# Patient Record
Sex: FEMALE | Race: WHITE
[De-identification: names, ages, dates, MRNs, and addresses within clinical notes are randomized per-mention and may not be internally consistent; named-entity substitution may affect disease eponyms.]

---

## 2018-02-20 ENCOUNTER — HOSPITAL ENCOUNTER (OUTPATIENT)
Dept: HOSPITAL 76 - DI | Age: 73
Discharge: HOME | End: 2018-02-20
Attending: PHYSICIAN ASSISTANT
Payer: MEDICARE

## 2018-02-20 DIAGNOSIS — Z12.31: Primary | ICD-10-CM

## 2018-02-20 DIAGNOSIS — Z80.3: ICD-10-CM

## 2018-02-20 PROCEDURE — 77067 SCR MAMMO BI INCL CAD: CPT

## 2018-02-28 ENCOUNTER — HOSPITAL ENCOUNTER (OUTPATIENT)
Dept: HOSPITAL 76 - LAB.WCP | Age: 73
Discharge: HOME | End: 2018-02-28
Attending: PHYSICIAN ASSISTANT
Payer: MEDICARE

## 2018-02-28 DIAGNOSIS — J45.20: ICD-10-CM

## 2018-02-28 DIAGNOSIS — E78.5: Primary | ICD-10-CM

## 2018-02-28 LAB
ALBUMIN DIAFP-MCNC: 4.3 G/DL (ref 3.2–5.5)
ALBUMIN/GLOB SERPL: 1.8 {RATIO} (ref 1–2.2)
ALP SERPL-CCNC: 60 IU/L (ref 42–121)
ALT SERPL W P-5'-P-CCNC: 22 IU/L (ref 10–60)
ANION GAP SERPL CALCULATED.4IONS-SCNC: 6 MMOL/L (ref 6–13)
AST SERPL W P-5'-P-CCNC: 20 IU/L (ref 10–42)
BASOPHILS NFR BLD AUTO: 0.1 10^3/UL (ref 0–0.1)
BASOPHILS NFR BLD AUTO: 0.9 %
BILIRUB BLD-MCNC: 0.5 MG/DL (ref 0.2–1)
BUN SERPL-MCNC: 16 MG/DL (ref 6–20)
CALCIUM UR-MCNC: 9 MG/DL (ref 8.5–10.3)
CHLORIDE SERPL-SCNC: 103 MMOL/L (ref 101–111)
CHOLEST SERPL-MCNC: 259 MG/DL
CO2 SERPL-SCNC: 27 MMOL/L (ref 21–32)
CREAT SERPLBLD-SCNC: 1 MG/DL (ref 0.4–1)
EOSINOPHIL # BLD AUTO: 0.3 10^3/UL (ref 0–0.7)
EOSINOPHIL NFR BLD AUTO: 3.8 %
ERYTHROCYTE [DISTWIDTH] IN BLOOD BY AUTOMATED COUNT: 14 % (ref 12–15)
GFRSERPLBLD MDRD-ARVRAT: 55 ML/MIN/{1.73_M2} (ref 89–?)
GLOBULIN SER-MCNC: 2.4 G/DL (ref 2.1–4.2)
GLUCOSE SERPL-MCNC: 96 MG/DL (ref 70–100)
HDLC SERPL-MCNC: 87 MG/DL
HDLC SERPL: 3 {RATIO} (ref ?–4.4)
HGB UR QL STRIP: 14.3 G/DL (ref 12–16)
LDLC SERPL CALC-MCNC: 143 MG/DL
LDLC/HDLC SERPL: 1.6 {RATIO} (ref ?–4.4)
LYMPHOCYTES # SPEC AUTO: 1.9 10^3/UL (ref 1.5–3.5)
LYMPHOCYTES NFR BLD AUTO: 28.1 %
MCH RBC QN AUTO: 28.4 PG (ref 27–31)
MCHC RBC AUTO-ENTMCNC: 32.9 G/DL (ref 32–36)
MCV RBC AUTO: 86.4 FL (ref 81–99)
MONOCYTES # BLD AUTO: 0.6 10^3/UL (ref 0–1)
MONOCYTES NFR BLD AUTO: 9 %
NEUTROPHILS # BLD AUTO: 3.9 10^3/UL (ref 1.5–6.6)
NEUTROPHILS # SNV AUTO: 6.7 X10^3/UL (ref 4.8–10.8)
NEUTROPHILS NFR BLD AUTO: 58.2 %
PDW BLD AUTO: 7.9 FL (ref 7.9–10.8)
PLATELET # BLD: 297 10^3/UL (ref 130–450)
PROT SPEC-MCNC: 6.7 G/DL (ref 6.7–8.2)
RBC MAR: 5.05 10^6/UL (ref 4.2–5.4)
SODIUM SERPLBLD-SCNC: 136 MMOL/L (ref 135–145)
VLDLC SERPL-SCNC: 29 MG/DL

## 2018-02-28 PROCEDURE — 80053 COMPREHEN METABOLIC PANEL: CPT

## 2018-02-28 PROCEDURE — 36415 COLL VENOUS BLD VENIPUNCTURE: CPT

## 2018-02-28 PROCEDURE — 85025 COMPLETE CBC W/AUTO DIFF WBC: CPT

## 2018-02-28 PROCEDURE — 83721 ASSAY OF BLOOD LIPOPROTEIN: CPT

## 2018-02-28 PROCEDURE — 80061 LIPID PANEL: CPT

## 2018-05-21 ENCOUNTER — HOSPITAL ENCOUNTER (OUTPATIENT)
Dept: HOSPITAL 76 - SDS | Age: 73
Discharge: HOME | End: 2018-05-21
Attending: SURGERY
Payer: MEDICARE

## 2018-05-21 VITALS — SYSTOLIC BLOOD PRESSURE: 131 MMHG | DIASTOLIC BLOOD PRESSURE: 64 MMHG

## 2018-05-21 DIAGNOSIS — D12.0: ICD-10-CM

## 2018-05-21 DIAGNOSIS — K64.8: ICD-10-CM

## 2018-05-21 DIAGNOSIS — Z79.82: ICD-10-CM

## 2018-05-21 DIAGNOSIS — J45.909: ICD-10-CM

## 2018-05-21 DIAGNOSIS — Z12.11: Primary | ICD-10-CM

## 2018-05-21 DIAGNOSIS — K57.30: ICD-10-CM

## 2018-05-21 DIAGNOSIS — K62.1: ICD-10-CM

## 2018-05-21 PROCEDURE — 45380 COLONOSCOPY AND BIOPSY: CPT

## 2018-05-21 PROCEDURE — 0DBH8ZX EXCISION OF CECUM, VIA NATURAL OR ARTIFICIAL OPENING ENDOSCOPIC, DIAGNOSTIC: ICD-10-PCS | Performed by: SURGERY

## 2018-05-21 PROCEDURE — 88305 TISSUE EXAM BY PATHOLOGIST: CPT

## 2018-05-21 PROCEDURE — 0DBP8ZX EXCISION OF RECTUM, VIA NATURAL OR ARTIFICIAL OPENING ENDOSCOPIC, DIAGNOSTIC: ICD-10-PCS | Performed by: SURGERY

## 2019-06-10 ENCOUNTER — HOSPITAL ENCOUNTER (OUTPATIENT)
Dept: HOSPITAL 76 - LAB.WCP | Age: 74
Discharge: HOME | End: 2019-06-10
Attending: PHYSICIAN ASSISTANT
Payer: MEDICARE

## 2019-06-10 DIAGNOSIS — E78.5: Primary | ICD-10-CM

## 2019-06-10 DIAGNOSIS — J30.9: ICD-10-CM

## 2019-06-10 LAB
ALBUMIN DIAFP-MCNC: 4.5 G/DL (ref 3.2–5.5)
ALBUMIN/GLOB SERPL: 1.7 {RATIO} (ref 1–2.2)
ALP SERPL-CCNC: 66 IU/L (ref 42–121)
ALT SERPL W P-5'-P-CCNC: 23 IU/L (ref 10–60)
ANION GAP SERPL CALCULATED.4IONS-SCNC: 9 MMOL/L (ref 6–13)
AST SERPL W P-5'-P-CCNC: 19 IU/L (ref 10–42)
BASOPHILS NFR BLD AUTO: 0.1 10^3/UL (ref 0–0.1)
BASOPHILS NFR BLD AUTO: 1.5 %
BILIRUB BLD-MCNC: 0.7 MG/DL (ref 0.2–1)
BUN SERPL-MCNC: 22 MG/DL (ref 6–20)
CALCIUM UR-MCNC: 9.1 MG/DL (ref 8.5–10.3)
CHLORIDE SERPL-SCNC: 103 MMOL/L (ref 101–111)
CHOLEST SERPL-MCNC: 239 MG/DL
CO2 SERPL-SCNC: 25 MMOL/L (ref 21–32)
CREAT SERPLBLD-SCNC: 1 MG/DL (ref 0.4–1)
EOSINOPHIL # BLD AUTO: 0.3 10^3/UL (ref 0–0.7)
EOSINOPHIL NFR BLD AUTO: 4.5 %
ERYTHROCYTE [DISTWIDTH] IN BLOOD BY AUTOMATED COUNT: 13.7 % (ref 12–15)
GFRSERPLBLD MDRD-ARVRAT: 54 ML/MIN/{1.73_M2} (ref 89–?)
GLOBULIN SER-MCNC: 2.7 G/DL (ref 2.1–4.2)
GLUCOSE SERPL-MCNC: 107 MG/DL (ref 70–100)
HDLC SERPL-MCNC: 93 MG/DL
HDLC SERPL: 2.6 {RATIO} (ref ?–4.4)
HGB UR QL STRIP: 13.8 G/DL (ref 12–16)
LDLC SERPL CALC-MCNC: 121 MG/DL
LDLC/HDLC SERPL: 1.3 {RATIO} (ref ?–4.4)
LYMPHOCYTES # SPEC AUTO: 1.9 10^3/UL (ref 1.5–3.5)
LYMPHOCYTES NFR BLD AUTO: 30.6 %
MCH RBC QN AUTO: 28.3 PG (ref 27–31)
MCHC RBC AUTO-ENTMCNC: 32.3 G/DL (ref 32–36)
MCV RBC AUTO: 87.6 FL (ref 81–99)
MONOCYTES # BLD AUTO: 0.6 10^3/UL (ref 0–1)
MONOCYTES NFR BLD AUTO: 9.1 %
NEUTROPHILS # BLD AUTO: 3.5 10^3/UL (ref 1.5–6.6)
NEUTROPHILS # SNV AUTO: 6.4 X10^3/UL (ref 4.8–10.8)
NEUTROPHILS NFR BLD AUTO: 54.3 %
PDW BLD AUTO: 7.9 FL (ref 7.9–10.8)
PLATELET # BLD: 310 10^3/UL (ref 130–450)
PROT SPEC-MCNC: 7.2 G/DL (ref 6.7–8.2)
RBC MAR: 4.87 10^6/UL (ref 4.2–5.4)
SODIUM SERPLBLD-SCNC: 137 MMOL/L (ref 135–145)
VLDLC SERPL-SCNC: 25 MG/DL

## 2019-06-10 PROCEDURE — 80061 LIPID PANEL: CPT

## 2019-06-10 PROCEDURE — 85025 COMPLETE CBC W/AUTO DIFF WBC: CPT

## 2019-06-10 PROCEDURE — 83721 ASSAY OF BLOOD LIPOPROTEIN: CPT

## 2019-06-10 PROCEDURE — 36415 COLL VENOUS BLD VENIPUNCTURE: CPT

## 2019-06-10 PROCEDURE — 80053 COMPREHEN METABOLIC PANEL: CPT

## 2019-06-26 ENCOUNTER — HOSPITAL ENCOUNTER (OUTPATIENT)
Dept: HOSPITAL 76 - DI | Age: 74
Discharge: HOME | End: 2019-06-26
Attending: GENERAL ACUTE CARE HOSPITAL
Payer: MEDICARE

## 2019-06-26 DIAGNOSIS — Z80.3: ICD-10-CM

## 2019-06-26 DIAGNOSIS — Z12.31: Primary | ICD-10-CM

## 2019-06-26 PROCEDURE — 77067 SCR MAMMO BI INCL CAD: CPT

## 2019-08-15 ENCOUNTER — HOSPITAL ENCOUNTER (OUTPATIENT)
Dept: HOSPITAL 76 - DI | Age: 74
Discharge: HOME | End: 2019-08-15
Attending: PHYSICIAN ASSISTANT
Payer: MEDICARE

## 2019-08-15 DIAGNOSIS — M85.89: Primary | ICD-10-CM

## 2019-08-15 PROCEDURE — 77080 DXA BONE DENSITY AXIAL: CPT

## 2019-10-28 ENCOUNTER — HOSPITAL ENCOUNTER (OUTPATIENT)
Dept: HOSPITAL 76 - LAB.WCP | Age: 74
Discharge: HOME | End: 2019-10-28
Attending: PHYSICIAN ASSISTANT
Payer: MEDICARE

## 2019-10-28 DIAGNOSIS — I10: Primary | ICD-10-CM

## 2019-10-28 LAB
ANION GAP SERPL CALCULATED.4IONS-SCNC: 9 MMOL/L (ref 6–13)
BUN SERPL-MCNC: 25 MG/DL (ref 6–20)
CALCIUM UR-MCNC: 9.2 MG/DL (ref 8.5–10.3)
CHLORIDE SERPL-SCNC: 102 MMOL/L (ref 101–111)
CO2 SERPL-SCNC: 29 MMOL/L (ref 21–32)
CREAT SERPLBLD-SCNC: 1.3 MG/DL (ref 0.4–1)
GFRSERPLBLD MDRD-ARVRAT: 40 ML/MIN/{1.73_M2} (ref 89–?)
GLUCOSE SERPL-MCNC: 113 MG/DL (ref 70–100)
SODIUM SERPLBLD-SCNC: 140 MMOL/L (ref 135–145)

## 2019-10-28 PROCEDURE — 80048 BASIC METABOLIC PNL TOTAL CA: CPT

## 2019-10-28 PROCEDURE — 36415 COLL VENOUS BLD VENIPUNCTURE: CPT

## 2019-11-12 ENCOUNTER — HOSPITAL ENCOUNTER (OUTPATIENT)
Age: 74
End: 2019-11-12
Payer: COMMERCIAL

## 2019-11-12 DIAGNOSIS — R07.89: Primary | ICD-10-CM

## 2019-11-12 DIAGNOSIS — I49.3: ICD-10-CM

## 2019-11-12 PROCEDURE — 93018 CV STRESS TEST I&R ONLY: CPT

## 2019-11-12 PROCEDURE — A9502 TC99M TETROFOSMIN: HCPCS

## 2019-11-12 PROCEDURE — 93306 TTE W/DOPPLER COMPLETE: CPT

## 2019-11-12 PROCEDURE — 93017 CV STRESS TEST TRACING ONLY: CPT

## 2019-11-12 PROCEDURE — 78452 HT MUSCLE IMAGE SPECT MULT: CPT

## 2019-11-12 PROCEDURE — 93016 CV STRESS TEST SUPVJ ONLY: CPT

## 2019-11-12 NOTE — P.PCN_ITS
Cardiac Stress Test Report    
Referral & Results    
Date Patient Seen: 11/12/19    
Time Patient Seen: 08:30    
Requesting provider: Lexy Solomon    
Indication: Chest pain    
Rest ECG: NSR with frequent PVCs    
Procedure Note: Today following both written and verbal informed consent the   
patient was exercised according to a standard Jp protocol patient went for a   
total of 6 minutes achieving a maximum heart rate of 127 maximum systolic blood   
pressure of 144.  This is approximately 7 METs.  Exercise was terminated at this  
point because of fatigue.    
    
Patient was also given Cardiolite through a previously started Hep-Lock IV by   
the nuclear medicine technician approximately 1 minute prior to the cessation of  
exercise.    
    
No signs or symptoms of angina.  NASEEM 0% on active scale.  Frequent PVCs with   
similar frequency between exercise and rest.  No change in rhythm during the   
exercise.    
Impression: Low probability for ischemia.  Will wait perfusion imaging.    
Please note: Actual ECG tracings can be found in the PACS system.

## 2019-11-12 NOTE — DI.ECHO.S_ITS
"                                    [facility]  
+---------+                        [facility]                        +---------+  
:         :                      [address]                     :         :  
:         :                      [address]                     :         :  
:         :                          [id number]                         :         :  
:         :                       Phone: [phone]                      :         :  
+---------+                        [phone]                        +---------+  
                             Echocardiogram Report  
+-----------------------------------------------------------------------------+  
:Name: GIOVANY DIAS             Study Date: 2019       Height: 59 in  :  
:[facility] MRN #: [id number]                                    Weight: 128 lb :  
:Account #: [id number]            Gender: Female               BSA: 1.5 m2    :  
:: 1945                  Age: 73 yrs                  BP: 132/82 mmHg:  
:Reason For Study: CHEST PAIN                                                 :  
:                                 Performed By: Temp Staff                    :  
:Referring: JAELYN BARROW                                                 :  
+-----------------------------------------------------------------------------+  
Interpretation Summary  
Left ventricular systolic function is normal without focal wall motion  
abnormalities with the ejection fraction visually estimated to be 60-65%.  
There is borderline concentric left ventricular hypertrophy with mild proximal  
septal thickening noted. Diastolic parameters suggest a relaxation abnormality  
of the left ventricle, consistent with probable normal filling pressures.  
   
The right ventricle is normal in size and function. The right ventricular  
systolic pressure is estimated to be at least 24 mmHg based on an estimated  
right atrial pressure of 3 mm Hg.  
   
Both atria are normal in size.  
   
There is no significant valvular heart disease.  
   
   
Procedure:   A two-dimensional transthoracic echocardiogram with color flow  
and Doppler was performed. The study quality was technically adequate. There  
is no prior echocardiogram noted for this patient. The patient was in normal  
sinus rhythm during the exam.  
Left Ventricle:   The left ventricle is normal in size. There is borderline  
concentric left ventricular hypertrophy. There is mild proximal septal  
thickening noted. Left ventricular systolic function is normal without focal  
wall motion abnormalities. The ejection fraction is estimated to be 60-65%.  
Diastolic parameters suggest a relaxation abnormality of the left ventricle,  
consistent with probable normal filling pressures.  
Right Ventricle:   The right ventricle is normal in size and function.  
Atria:   Both atria are normal in size. The interatrial septum is intact with  
no evidence for an atrial septal defect.  
Mitral Valve:   The mitral valve leaflets appear borderline thickened, but  
open well. There is trace mitral regurgitation.  
Aortic Valve:   The aortic valve is trileaflet. There is mild aortic valve  
sclerosis. The aortic valve opens well. No aortic regurgitation is present.  
Tricuspid Valve:   The tricuspid valve is normal in structure and function.  
There is trace tricuspid regurgitation. The right ventricular systolic  
pressure is estimated to be at least 24 mmHg based on an estimated right  
atrial pressure of 3 mm Hg.  
Pulmonic Valve:   The pulmonic valve is not well visualized. There is trace  
pulmonic regurgitation. There is no significant valvular heart disease.  
Great Vessels:   The aortic root is normal size. The dimensions of the  
ascending aorta are normal. The pulmonary artery is normal size.  
Pericardium/ Pleura   There is no pericardial effusion. There is no pleural  
effusion.  
   
   
MMode/2D Measurements & Calculations  
LVIDd: 2.9 cm                         
[id number]|2019 08:53:17|2019 08:53:17|PM.TREADMILL||||"Cardiac Stress Test Report

## 2019-11-12 NOTE — DI.NM.S_ITS
PROCEDURE:  NM WARREN PERF SPECT REST & STR  
Rest and exercise myocardial perfusion SPECT with gated imaging and ejection fraction  
   
RADIOPHARMACEUTICAL:  24.3 mCi Tc-99m sestamibi IV at rest and 26.7 mCi Tc-99m sestamibi   
IV at peak exercise.  A 2-day-protocol was performed.    
   
INDICATIONS:  ATYPICAL CHEST PAIN  
   
   
TECHNIQUE:  Radiopharmaceutical was injected at peak stress test, and also at rest.    
SPECT images were obtained.  SPECT myocardial perfusion images were displayed in short   
axis, horizontal long axis, and vertical long axis views.  Gated images were reviewed   
using [employer] software.    
   
COMPARISON:  None.  
   
CARDIAC STRESS:    
A standard Jp treadmill exercise tolerance test was performed by the patient under the   
supervision of an attending staff.  The patient exercised for 6 minutes and 2seconds;   
functional aerobic impairment (NASEEM) is 0%.    
Hemodynamic data:  There is normal blood pressure and heart rate response to exercise   
stress.  Patient achieved 86% of maximum predicted heart rate at peak exercise.    
Symptoms:  Patient denied chest pain during exercise.    
EKG:  Less than 1 mm upsloping ST depression with exercise. No diagnostic EKG changes of   
ischemia; no ectopy.    
   
FINDINGS:    
   
Raw data:  There is good myocardial labeling by radiotracer.  No significant motion   
artifacts.  Lung-to-heart ratio is 0.17 (normal is less than 0.38 for sestamibi tracer,   
and less than 0.50 for thallium tracer).    
   
Left ventricle function:  Gated images demonstrate normal left ventricle wall thickening.   
 No segmental wall motion abnormality.  No transient ischemic dilation; TID is 0.79   
(normal less than 1.3).  The left ventricle resting end-diastolic volume is 43 mL.  Left   
ventricle stress ejection fraction is >75% ; normal values are above 45%.    
   
Myocardial perfusion:  There is a mild, small apical defect on rest and supine stress   
images that resolves on prone imaging, consistent with artifact. Otherwise normal   
distribution of activity in the left ventricular myocardium.  No fixed or reversible   
perfusion defects.    
   
IMPRESSION:    
-Normal myocardial perfusion study with no evidence of ischemia or scar.   
-Fair exercise capacity.    
-Vigorous LVEF, overestimated due to small LV cavity.   
-Low-risk study.  
   
   
Dictated by: Jose Ross M.D. on 11/13/2019 at 17:57       
Approved by: Jose Ross M.D. on 11/13/2019 at 18:02

## 2019-11-13 ENCOUNTER — HOSPITAL ENCOUNTER (OUTPATIENT)
Age: 74
End: 2019-11-13
Payer: COMMERCIAL

## 2019-11-13 DIAGNOSIS — R07.89: Primary | ICD-10-CM

## 2019-11-13 DIAGNOSIS — Z53.9: ICD-10-CM

## 2019-11-13 PROCEDURE — 93018 CV STRESS TEST I&R ONLY: CPT

## 2019-11-13 PROCEDURE — 78452 HT MUSCLE IMAGE SPECT MULT: CPT

## 2019-11-13 PROCEDURE — 93017 CV STRESS TEST TRACING ONLY: CPT

## 2019-11-13 PROCEDURE — A9502 TC99M TETROFOSMIN: HCPCS

## 2019-11-13 PROCEDURE — 93016 CV STRESS TEST SUPVJ ONLY: CPT

## 2020-04-27 ENCOUNTER — HOSPITAL ENCOUNTER (OUTPATIENT)
Dept: HOSPITAL 76 - LAB.WCP | Age: 75
Discharge: HOME | End: 2020-04-27
Attending: PHYSICIAN ASSISTANT
Payer: MEDICARE

## 2020-04-27 DIAGNOSIS — J30.9: ICD-10-CM

## 2020-04-27 DIAGNOSIS — E78.5: Primary | ICD-10-CM

## 2020-04-27 LAB
ALBUMIN DIAFP-MCNC: 4.3 G/DL (ref 3.2–5.5)
ALBUMIN/GLOB SERPL: 1.4 {RATIO} (ref 1–2.2)
ALP SERPL-CCNC: 70 IU/L (ref 42–121)
ALT SERPL W P-5'-P-CCNC: 37 IU/L (ref 10–60)
ANION GAP SERPL CALCULATED.4IONS-SCNC: 10 MMOL/L (ref 6–13)
AST SERPL W P-5'-P-CCNC: 20 IU/L (ref 10–42)
BASOPHILS NFR BLD AUTO: 0.1 10^3/UL (ref 0–0.1)
BASOPHILS NFR BLD AUTO: 0.7 %
BILIRUB BLD-MCNC: 0.8 MG/DL (ref 0.2–1)
BUN SERPL-MCNC: 30 MG/DL (ref 6–20)
CALCIUM UR-MCNC: 9.3 MG/DL (ref 8.5–10.3)
CHLORIDE SERPL-SCNC: 101 MMOL/L (ref 101–111)
CHOLEST SERPL-MCNC: 290 MG/DL
CO2 SERPL-SCNC: 28 MMOL/L (ref 21–32)
CREAT SERPLBLD-SCNC: 1.2 MG/DL (ref 0.4–1)
EOSINOPHIL # BLD AUTO: 0.2 10^3/UL (ref 0–0.7)
EOSINOPHIL NFR BLD AUTO: 2.3 %
ERYTHROCYTE [DISTWIDTH] IN BLOOD BY AUTOMATED COUNT: 13.7 % (ref 12–15)
GLOBULIN SER-MCNC: 3.1 G/DL (ref 2.1–4.2)
GLUCOSE SERPL-MCNC: 101 MG/DL (ref 70–100)
HDLC SERPL-MCNC: 82 MG/DL
HDLC SERPL: 3.5 {RATIO} (ref ?–4.4)
HGB UR QL STRIP: 14.3 G/DL (ref 12–16)
LDLC SERPL CALC-MCNC: 182 MG/DL
LDLC/HDLC SERPL: 2.2 {RATIO} (ref ?–4.4)
LYMPHOCYTES # SPEC AUTO: 2 10^3/UL (ref 1.5–3.5)
LYMPHOCYTES NFR BLD AUTO: 27 %
MCH RBC QN AUTO: 28.1 PG (ref 27–31)
MCHC RBC AUTO-ENTMCNC: 31.2 G/DL (ref 32–36)
MCV RBC AUTO: 90.2 FL (ref 81–99)
MONOCYTES # BLD AUTO: 0.7 10^3/UL (ref 0–1)
MONOCYTES NFR BLD AUTO: 9 %
NEUTROPHILS # BLD AUTO: 4.6 10^3/UL (ref 1.5–6.6)
NEUTROPHILS # SNV AUTO: 7.5 X10^3/UL (ref 4.8–10.8)
NEUTROPHILS NFR BLD AUTO: 60.7 %
PDW BLD AUTO: 10 FL (ref 7.9–10.8)
PLATELET # BLD: 327 10^3/UL (ref 130–450)
PROT SPEC-MCNC: 7.4 G/DL (ref 6.7–8.2)
RBC MAR: 5.08 10^6/UL (ref 4.2–5.4)
SODIUM SERPLBLD-SCNC: 139 MMOL/L (ref 135–145)
VLDLC SERPL-SCNC: 26 MG/DL

## 2020-04-27 PROCEDURE — 36415 COLL VENOUS BLD VENIPUNCTURE: CPT

## 2020-04-27 PROCEDURE — 80053 COMPREHEN METABOLIC PANEL: CPT

## 2020-04-27 PROCEDURE — 83721 ASSAY OF BLOOD LIPOPROTEIN: CPT

## 2020-04-27 PROCEDURE — 85025 COMPLETE CBC W/AUTO DIFF WBC: CPT

## 2020-04-27 PROCEDURE — 80061 LIPID PANEL: CPT

## 2021-08-03 ENCOUNTER — HOSPITAL ENCOUNTER (OUTPATIENT)
Dept: HOSPITAL 76 - LAB.WCP | Age: 76
Discharge: HOME | End: 2021-08-03
Attending: PHYSICIAN ASSISTANT
Payer: MEDICARE

## 2021-08-03 DIAGNOSIS — E78.5: Primary | ICD-10-CM

## 2021-08-03 DIAGNOSIS — J30.9: ICD-10-CM

## 2021-08-03 LAB
ALBUMIN DIAFP-MCNC: 4.2 G/DL (ref 3.2–5.5)
ALBUMIN/GLOB SERPL: 1.5 {RATIO} (ref 1–2.2)
ALP SERPL-CCNC: 62 IU/L (ref 42–121)
ALT SERPL W P-5'-P-CCNC: 20 IU/L (ref 10–60)
ANION GAP SERPL CALCULATED.4IONS-SCNC: 10 MMOL/L (ref 6–13)
AST SERPL W P-5'-P-CCNC: 16 IU/L (ref 10–42)
BASOPHILS NFR BLD AUTO: 0 10^3/UL (ref 0–0.1)
BASOPHILS NFR BLD AUTO: 0.7 %
BILIRUB BLD-MCNC: 0.7 MG/DL (ref 0.2–1)
BUN SERPL-MCNC: 25 MG/DL (ref 6–20)
CALCIUM UR-MCNC: 9.1 MG/DL (ref 8.5–10.3)
CHLORIDE SERPL-SCNC: 100 MMOL/L (ref 101–111)
CHOLEST SERPL-MCNC: 279 MG/DL
CO2 SERPL-SCNC: 27 MMOL/L (ref 21–32)
CREAT SERPLBLD-SCNC: 1.1 MG/DL (ref 0.4–1)
EOSINOPHIL # BLD AUTO: 0.2 10^3/UL (ref 0–0.7)
EOSINOPHIL NFR BLD AUTO: 2.6 %
ERYTHROCYTE [DISTWIDTH] IN BLOOD BY AUTOMATED COUNT: 13.1 % (ref 12–15)
GFRSERPLBLD MDRD-ARVRAT: 48 ML/MIN/{1.73_M2} (ref 89–?)
GLOBULIN SER-MCNC: 2.8 G/DL (ref 2.1–4.2)
GLUCOSE SERPL-MCNC: 109 MG/DL (ref 70–100)
HCT VFR BLD AUTO: 43.9 % (ref 37–47)
HDLC SERPL-MCNC: 94 MG/DL
HDLC SERPL: 3 {RATIO} (ref ?–4.4)
HGB UR QL STRIP: 13.8 G/DL (ref 12–16)
LDLC SERPL CALC-MCNC: 164 MG/DL
LDLC/HDLC SERPL: 1.7 {RATIO} (ref ?–4.4)
LYMPHOCYTES # SPEC AUTO: 2 10^3/UL (ref 1.5–3.5)
LYMPHOCYTES NFR BLD AUTO: 33.7 %
MCH RBC QN AUTO: 28.9 PG (ref 27–31)
MCHC RBC AUTO-ENTMCNC: 31.4 G/DL (ref 32–36)
MCV RBC AUTO: 91.8 FL (ref 81–99)
MONOCYTES # BLD AUTO: 0.5 10^3/UL (ref 0–1)
MONOCYTES NFR BLD AUTO: 8.6 %
NEUTROPHILS # BLD AUTO: 3.1 10^3/UL (ref 1.5–6.6)
NEUTROPHILS # SNV AUTO: 5.8 X10^3/UL (ref 4.8–10.8)
NEUTROPHILS NFR BLD AUTO: 54.2 %
NRBC # BLD AUTO: 0 /100WBC
NRBC # BLD AUTO: 0 X10^3/UL
PDW BLD AUTO: 10.1 FL (ref 7.9–10.8)
PLATELET # BLD: 314 10^3/UL (ref 130–450)
POTASSIUM SERPL-SCNC: 4.6 MMOL/L (ref 3.5–5)
PROT SPEC-MCNC: 7 G/DL (ref 6.7–8.2)
RBC MAR: 4.78 10^6/UL (ref 4.2–5.4)
SODIUM SERPLBLD-SCNC: 137 MMOL/L (ref 135–145)
TRIGL P FAST SERPL-MCNC: 103 MG/DL
VLDLC SERPL-SCNC: 21 MG/DL

## 2021-08-03 PROCEDURE — 80053 COMPREHEN METABOLIC PANEL: CPT

## 2021-08-03 PROCEDURE — 36415 COLL VENOUS BLD VENIPUNCTURE: CPT

## 2021-08-03 PROCEDURE — 85025 COMPLETE CBC W/AUTO DIFF WBC: CPT

## 2021-08-03 PROCEDURE — 83721 ASSAY OF BLOOD LIPOPROTEIN: CPT

## 2021-08-03 PROCEDURE — 80061 LIPID PANEL: CPT

## 2022-02-02 ENCOUNTER — HOSPITAL ENCOUNTER (OUTPATIENT)
Age: 77
End: 2022-02-02
Payer: COMMERCIAL

## 2022-02-02 DIAGNOSIS — Z12.31: Primary | ICD-10-CM

## 2022-02-02 DIAGNOSIS — Z80.3: ICD-10-CM

## 2022-02-02 PROCEDURE — 77063 BREAST TOMOSYNTHESIS BI: CPT

## 2022-02-02 PROCEDURE — 77067 SCR MAMMO BI INCL CAD: CPT

## 2022-04-25 ENCOUNTER — HOSPITAL ENCOUNTER (OUTPATIENT)
Dept: HOSPITAL 76 - LAB.N | Age: 77
Discharge: HOME | End: 2022-04-25
Attending: PHYSICIAN ASSISTANT
Payer: MEDICARE

## 2022-04-25 DIAGNOSIS — E78.5: Primary | ICD-10-CM

## 2022-04-25 LAB
ALBUMIN DIAFP-MCNC: 4 G/DL (ref 3.2–5.5)
ALBUMIN/GLOB SERPL: 1.5 {RATIO} (ref 1–2.2)
ALP SERPL-CCNC: 60 IU/L (ref 42–121)
ALT SERPL W P-5'-P-CCNC: 16 IU/L (ref 10–60)
ANION GAP SERPL CALCULATED.4IONS-SCNC: 9 MMOL/L (ref 6–13)
AST SERPL W P-5'-P-CCNC: 16 IU/L (ref 10–42)
BILIRUB BLD-MCNC: 0.6 MG/DL (ref 0.2–1)
BUN SERPL-MCNC: 30 MG/DL (ref 6–20)
CALCIUM UR-MCNC: 9.1 MG/DL (ref 8.5–10.3)
CHLORIDE SERPL-SCNC: 102 MMOL/L (ref 101–111)
CHOLEST SERPL-MCNC: 283 MG/DL
CO2 SERPL-SCNC: 27 MMOL/L (ref 21–32)
CREAT SERPLBLD-SCNC: 1 MG/DL (ref 0.4–1)
GFRSERPLBLD MDRD-ARVRAT: 54 ML/MIN/{1.73_M2} (ref 89–?)
GLOBULIN SER-MCNC: 2.7 G/DL (ref 2.1–4.2)
GLUCOSE SERPL-MCNC: 98 MG/DL (ref 70–100)
HDLC SERPL-MCNC: 100 MG/DL
HDLC SERPL: 2.8 {RATIO} (ref ?–4.4)
LDLC SERPL CALC-MCNC: 165 MG/DL
LDLC/HDLC SERPL: 1.7 {RATIO} (ref ?–4.4)
POTASSIUM SERPL-SCNC: 4.4 MMOL/L (ref 3.5–5)
PROT SPEC-MCNC: 6.7 G/DL (ref 6.7–8.2)
SODIUM SERPLBLD-SCNC: 138 MMOL/L (ref 135–145)
TRIGL P FAST SERPL-MCNC: 92 MG/DL
VLDLC SERPL-SCNC: 18 MG/DL

## 2022-04-25 PROCEDURE — 80061 LIPID PANEL: CPT

## 2022-04-25 PROCEDURE — 80053 COMPREHEN METABOLIC PANEL: CPT

## 2022-04-25 PROCEDURE — 83721 ASSAY OF BLOOD LIPOPROTEIN: CPT

## 2022-04-25 PROCEDURE — 36415 COLL VENOUS BLD VENIPUNCTURE: CPT

## 2023-05-03 ENCOUNTER — HOSPITAL ENCOUNTER (OUTPATIENT)
Dept: HOSPITAL 76 - LAB.N | Age: 78
Discharge: HOME | End: 2023-05-03
Attending: PHYSICIAN ASSISTANT
Payer: MEDICARE

## 2023-05-03 DIAGNOSIS — J30.9: ICD-10-CM

## 2023-05-03 DIAGNOSIS — E78.5: Primary | ICD-10-CM

## 2023-05-03 LAB
ALBUMIN DIAFP-MCNC: 4.1 G/DL (ref 3.2–5.5)
ALBUMIN/GLOB SERPL: 1.6 {RATIO} (ref 1–2.2)
ALP SERPL-CCNC: 69 IU/L (ref 42–121)
ALT SERPL W P-5'-P-CCNC: 16 IU/L (ref 10–60)
ANION GAP SERPL CALCULATED.4IONS-SCNC: 4 MMOL/L (ref 6–13)
AST SERPL W P-5'-P-CCNC: 17 IU/L (ref 10–42)
BASOPHILS NFR BLD AUTO: 0.1 10^3/UL (ref 0–0.1)
BASOPHILS NFR BLD AUTO: 0.8 %
BILIRUB BLD-MCNC: 0.5 MG/DL (ref 0.2–1)
BUN SERPL-MCNC: 28 MG/DL (ref 6–20)
CALCIUM UR-MCNC: 9.3 MG/DL (ref 8.5–10.3)
CHLORIDE SERPL-SCNC: 107 MMOL/L (ref 101–111)
CHOLEST SERPL-MCNC: 256 MG/DL
CO2 SERPL-SCNC: 29 MMOL/L (ref 21–32)
CREAT SERPLBLD-SCNC: 1.2 MG/DL (ref 0.4–1)
EOSINOPHIL # BLD AUTO: 0.3 10^3/UL (ref 0–0.7)
EOSINOPHIL NFR BLD AUTO: 4.1 %
ERYTHROCYTE [DISTWIDTH] IN BLOOD BY AUTOMATED COUNT: 13.3 % (ref 12–15)
GFRSERPLBLD MDRD-ARVRAT: 44 ML/MIN/{1.73_M2} (ref 89–?)
GLOBULIN SER-MCNC: 2.6 G/DL (ref 2.1–4.2)
GLUCOSE SERPL-MCNC: 100 MG/DL (ref 70–100)
HCT VFR BLD AUTO: 43.6 % (ref 37–47)
HDLC SERPL-MCNC: 87 MG/DL
HDLC SERPL: 2.9 {RATIO} (ref ?–4.4)
HGB UR QL STRIP: 13.7 G/DL (ref 12–16)
LDLC SERPL CALC-MCNC: 148 MG/DL
LDLC/HDLC SERPL: 1.7 {RATIO} (ref ?–4.4)
LYMPHOCYTES # SPEC AUTO: 2.2 10^3/UL (ref 1.5–3.5)
LYMPHOCYTES NFR BLD AUTO: 34.2 %
MCH RBC QN AUTO: 28.6 PG (ref 27–31)
MCHC RBC AUTO-ENTMCNC: 31.4 G/DL (ref 32–36)
MCV RBC AUTO: 91 FL (ref 81–99)
MONOCYTES # BLD AUTO: 0.7 10^3/UL (ref 0–1)
MONOCYTES NFR BLD AUTO: 10.8 %
NEUTROPHILS # BLD AUTO: 3.2 10^3/UL (ref 1.5–6.6)
NEUTROPHILS # SNV AUTO: 6.4 X10^3/UL (ref 4.8–10.8)
NEUTROPHILS NFR BLD AUTO: 49.9 %
NRBC # BLD AUTO: 0 /100WBC
NRBC # BLD AUTO: 0 X10^3/UL
PDW BLD AUTO: 10.1 FL (ref 7.9–10.8)
PLATELET # BLD: 316 10^3/UL (ref 130–450)
POTASSIUM SERPL-SCNC: 4.7 MMOL/L (ref 3.5–5)
PROT SPEC-MCNC: 6.7 G/DL (ref 6.7–8.2)
RBC MAR: 4.79 10^6/UL (ref 4.2–5.4)
SODIUM SERPLBLD-SCNC: 140 MMOL/L (ref 135–145)
TRIGL P FAST SERPL-MCNC: 106 MG/DL
VLDLC SERPL-SCNC: 21 MG/DL

## 2023-05-03 PROCEDURE — 80061 LIPID PANEL: CPT

## 2023-05-03 PROCEDURE — 80053 COMPREHEN METABOLIC PANEL: CPT

## 2023-05-03 PROCEDURE — 85025 COMPLETE CBC W/AUTO DIFF WBC: CPT

## 2023-05-03 PROCEDURE — 83721 ASSAY OF BLOOD LIPOPROTEIN: CPT

## 2023-05-03 PROCEDURE — 36415 COLL VENOUS BLD VENIPUNCTURE: CPT

## 2023-06-13 ENCOUNTER — HOSPITAL ENCOUNTER (OUTPATIENT)
Age: 78
End: 2023-06-13
Payer: COMMERCIAL

## 2023-06-13 DIAGNOSIS — Z12.31: Primary | ICD-10-CM

## 2023-06-13 DIAGNOSIS — Z80.3: ICD-10-CM

## 2023-06-13 PROCEDURE — 77067 SCR MAMMO BI INCL CAD: CPT

## 2023-06-13 PROCEDURE — 77063 BREAST TOMOSYNTHESIS BI: CPT

## 2023-06-29 ENCOUNTER — HOSPITAL ENCOUNTER (OUTPATIENT)
Dept: HOSPITAL 76 - LAB.N | Age: 78
Discharge: HOME | End: 2023-06-29
Attending: FAMILY MEDICINE
Payer: MEDICARE

## 2023-06-29 DIAGNOSIS — I10: Primary | ICD-10-CM

## 2023-06-29 LAB
ANION GAP SERPL CALCULATED.4IONS-SCNC: 7 MMOL/L (ref 6–13)
BUN SERPL-MCNC: 36 MG/DL (ref 6–20)
CALCIUM UR-MCNC: 9.2 MG/DL (ref 8.5–10.3)
CHLORIDE SERPL-SCNC: 102 MMOL/L (ref 101–111)
CO2 SERPL-SCNC: 29 MMOL/L (ref 21–32)
CREAT SERPLBLD-SCNC: 1.5 MG/DL (ref 0.4–1)
GFRSERPLBLD MDRD-ARVRAT: 34 ML/MIN/{1.73_M2} (ref 89–?)
GLUCOSE SERPL-MCNC: 111 MG/DL (ref 70–100)
POTASSIUM SERPL-SCNC: 4.8 MMOL/L (ref 3.5–5)
SODIUM SERPLBLD-SCNC: 138 MMOL/L (ref 135–145)

## 2023-06-29 PROCEDURE — 80048 BASIC METABOLIC PNL TOTAL CA: CPT

## 2023-06-29 PROCEDURE — 36415 COLL VENOUS BLD VENIPUNCTURE: CPT

## 2023-07-07 ENCOUNTER — HOSPITAL ENCOUNTER (OUTPATIENT)
Dept: HOSPITAL 76 - LAB.N | Age: 78
Discharge: HOME | End: 2023-07-07
Attending: PHYSICIAN ASSISTANT
Payer: MEDICARE

## 2023-07-07 DIAGNOSIS — N28.9: Primary | ICD-10-CM

## 2023-07-07 LAB
ANION GAP SERPL CALCULATED.4IONS-SCNC: 9 MMOL/L (ref 6–13)
BUN SERPL-MCNC: 34 MG/DL (ref 6–20)
CALCIUM UR-MCNC: 9.1 MG/DL (ref 8.5–10.3)
CHLORIDE SERPL-SCNC: 102 MMOL/L (ref 101–111)
CLARITY UR REFRACT.AUTO: (no result)
CO2 SERPL-SCNC: 27 MMOL/L (ref 21–32)
CREAT SERPLBLD-SCNC: 1.4 MG/DL (ref 0.4–1)
CREAT UR-SCNC: 152.9 MG/DL
GFRSERPLBLD MDRD-ARVRAT: 36 ML/MIN/{1.73_M2} (ref 89–?)
GLUCOSE SERPL-MCNC: 99 MG/DL (ref 70–100)
GLUCOSE UR QL STRIP.AUTO: NEGATIVE MG/DL
KETONES UR QL STRIP.AUTO: NEGATIVE MG/DL
NITRITE UR QL STRIP.AUTO: NEGATIVE
PH UR STRIP.AUTO: 5.5 PH (ref 5–7.5)
POTASSIUM SERPL-SCNC: 4.6 MMOL/L (ref 3.5–5)
PROT UR STRIP.AUTO-MCNC: NEGATIVE MG/DL
RBC # UR STRIP.AUTO: NEGATIVE /UL
RBC # URNS HPF: (no result) /HPF (ref 0–5)
SODIUM SERPLBLD-SCNC: 138 MMOL/L (ref 135–145)
SP GR UR STRIP.AUTO: 1.02 (ref 1–1.03)
SQUAMOUS URNS QL MICRO: (no result)
UROBILINOGEN UR QL STRIP.AUTO: (no result) E.U./DL
UROBILINOGEN UR STRIP.AUTO-MCNC: NEGATIVE MG/DL
WBC # UR MANUAL: (no result) /HPF (ref 0–5)

## 2023-07-07 PROCEDURE — 84300 ASSAY OF URINE SODIUM: CPT

## 2023-07-07 PROCEDURE — 82570 ASSAY OF URINE CREATININE: CPT

## 2023-07-07 PROCEDURE — 36415 COLL VENOUS BLD VENIPUNCTURE: CPT

## 2023-07-07 PROCEDURE — 81001 URINALYSIS AUTO W/SCOPE: CPT

## 2023-07-07 PROCEDURE — 80048 BASIC METABOLIC PNL TOTAL CA: CPT

## 2023-07-08 ENCOUNTER — HOSPITAL ENCOUNTER (OUTPATIENT)
Dept: HOSPITAL 76 - DI | Age: 78
Discharge: HOME | End: 2023-07-08
Attending: PHYSICIAN ASSISTANT
Payer: MEDICARE

## 2023-07-08 DIAGNOSIS — N28.9: Primary | ICD-10-CM

## 2023-08-03 ENCOUNTER — HOSPITAL ENCOUNTER (OUTPATIENT)
Dept: HOSPITAL 76 - LAB.N | Age: 78
Discharge: HOME | End: 2023-08-03
Attending: PHYSICIAN ASSISTANT
Payer: MEDICARE

## 2023-08-03 DIAGNOSIS — N28.9: Primary | ICD-10-CM

## 2023-08-03 LAB
ANION GAP SERPL CALCULATED.4IONS-SCNC: 7 MMOL/L (ref 6–13)
BUN SERPL-MCNC: 32 MG/DL (ref 6–20)
CALCIUM UR-MCNC: 9.5 MG/DL (ref 8.5–10.3)
CHLORIDE SERPL-SCNC: 101 MMOL/L (ref 101–111)
CO2 SERPL-SCNC: 30 MMOL/L (ref 21–32)
CREAT SERPLBLD-SCNC: 1.6 MG/DL (ref 0.6–1.3)
GFRSERPLBLD MDRD-ARVRAT: 31 ML/MIN/{1.73_M2} (ref 89–?)
GLUCOSE SERPL-MCNC: 101 MG/DL (ref 74–104)
POTASSIUM SERPL-SCNC: 4.5 MMOL/L (ref 3.5–4.5)
SODIUM SERPLBLD-SCNC: 138 MMOL/L (ref 135–145)

## 2023-08-03 PROCEDURE — 36415 COLL VENOUS BLD VENIPUNCTURE: CPT

## 2023-08-03 PROCEDURE — 80048 BASIC METABOLIC PNL TOTAL CA: CPT

## 2023-08-08 ENCOUNTER — HOSPITAL ENCOUNTER (OUTPATIENT)
Dept: HOSPITAL 76 - LAB.WCP | Age: 78
Discharge: HOME | End: 2023-08-08
Attending: PHYSICIAN ASSISTANT
Payer: MEDICARE

## 2023-08-08 ENCOUNTER — HOSPITAL ENCOUNTER (OUTPATIENT)
Age: 78
End: 2023-08-08
Payer: COMMERCIAL

## 2023-08-08 DIAGNOSIS — M25.561: ICD-10-CM

## 2023-08-08 DIAGNOSIS — M25.562: ICD-10-CM

## 2023-08-08 DIAGNOSIS — M25.461: ICD-10-CM

## 2023-08-08 DIAGNOSIS — K57.30: Primary | ICD-10-CM

## 2023-08-08 DIAGNOSIS — R10.32: Primary | ICD-10-CM

## 2023-08-08 DIAGNOSIS — M17.0: ICD-10-CM

## 2023-08-08 DIAGNOSIS — M25.462: ICD-10-CM

## 2023-08-08 DIAGNOSIS — R10.32: ICD-10-CM

## 2023-08-08 PROCEDURE — 87086 URINE CULTURE/COLONY COUNT: CPT

## 2023-08-08 PROCEDURE — 73562 X-RAY EXAM OF KNEE 3: CPT

## 2023-08-08 PROCEDURE — 74176 CT ABD & PELVIS W/O CONTRAST: CPT

## 2024-02-28 ENCOUNTER — HOSPITAL ENCOUNTER (OUTPATIENT)
Dept: HOSPITAL 76 - LAB.N | Age: 79
Discharge: HOME | End: 2024-02-28
Attending: PHYSICIAN ASSISTANT
Payer: MEDICARE

## 2024-02-28 DIAGNOSIS — N18.9: ICD-10-CM

## 2024-02-28 DIAGNOSIS — I12.9: Primary | ICD-10-CM

## 2024-02-28 DIAGNOSIS — E78.5: ICD-10-CM

## 2024-02-28 LAB
ALBUMIN DIAFP-MCNC: 4.3 G/DL (ref 3.2–5.5)
ALBUMIN/GLOB SERPL: 1.6 {RATIO} (ref 1–2.2)
ALP SERPL-CCNC: 64 IU/L (ref 42–121)
ALT SERPL W P-5'-P-CCNC: 13 IU/L (ref 10–60)
ANION GAP SERPL CALCULATED.4IONS-SCNC: 5 MMOL/L (ref 6–13)
AST SERPL W P-5'-P-CCNC: 15 IU/L (ref 10–42)
BASOPHILS NFR BLD AUTO: 0.1 10^3/UL (ref 0–0.1)
BASOPHILS NFR BLD AUTO: 1.1 %
BILIRUB BLD-MCNC: 0.4 MG/DL (ref 0.2–1)
BUN SERPL-MCNC: 27 MG/DL (ref 6–20)
CALCIUM UR-MCNC: 9.8 MG/DL (ref 8.5–10.3)
CHLORIDE SERPL-SCNC: 104 MMOL/L (ref 101–111)
CHOLEST SERPL-MCNC: 247 MG/DL
CO2 SERPL-SCNC: 29 MMOL/L (ref 21–32)
CREAT SERPLBLD-SCNC: 1.3 MG/DL (ref 0.6–1.3)
EOSINOPHIL # BLD AUTO: 0.2 10^3/UL (ref 0–0.7)
EOSINOPHIL NFR BLD AUTO: 2.6 %
ERYTHROCYTE [DISTWIDTH] IN BLOOD BY AUTOMATED COUNT: 13 % (ref 12–15)
GFRSERPLBLD MDRD-ARVRAT: 40 ML/MIN/{1.73_M2} (ref 89–?)
GLOBULIN SER-MCNC: 2.7 G/DL (ref 2.1–4.2)
GLUCOSE SERPL-MCNC: 97 MG/DL (ref 74–104)
HCT VFR BLD AUTO: 44.4 % (ref 37–47)
HDLC SERPL-MCNC: 87 MG/DL
HDLC SERPL: 2.8 {RATIO} (ref ?–4.4)
HGB UR QL STRIP: 13.7 G/DL (ref 12–16)
LDLC SERPL CALC-MCNC: 134 MG/DL
LDLC/HDLC SERPL: 1.5 {RATIO} (ref ?–4.4)
LYMPHOCYTES # SPEC AUTO: 2.3 10^3/UL (ref 1.5–3.5)
LYMPHOCYTES NFR BLD AUTO: 35.1 %
MCH RBC QN AUTO: 27.8 PG (ref 27–31)
MCHC RBC AUTO-ENTMCNC: 30.9 G/DL (ref 32–36)
MCV RBC AUTO: 90.2 FL (ref 81–99)
MONOCYTES # BLD AUTO: 0.7 10^3/UL (ref 0–1)
MONOCYTES NFR BLD AUTO: 9.9 %
NEUTROPHILS # BLD AUTO: 3.4 10^3/UL (ref 1.5–6.6)
NEUTROPHILS # SNV AUTO: 6.6 X10^3/UL (ref 4.8–10.8)
NEUTROPHILS NFR BLD AUTO: 51.1 %
NRBC # BLD AUTO: 0 /100WBC
NRBC # BLD AUTO: 0 X10^3/UL
PDW BLD AUTO: 10.1 FL (ref 7.9–10.8)
PLATELET # BLD: 326 10^3/UL (ref 130–450)
POTASSIUM SERPL-SCNC: 4.9 MMOL/L (ref 3.5–4.5)
PROT SPEC-MCNC: 7 G/DL (ref 6.4–8.9)
RBC MAR: 4.92 10^6/UL (ref 4.2–5.4)
SODIUM SERPLBLD-SCNC: 138 MMOL/L (ref 135–145)
TRIGL P FAST SERPL-MCNC: 129 MG/DL (ref 48–352)
TSH SERPL-ACNC: 3.4 UIU/ML (ref 0.34–5.6)
VLDLC SERPL-SCNC: 26 MG/DL

## 2024-02-28 PROCEDURE — 36415 COLL VENOUS BLD VENIPUNCTURE: CPT

## 2024-02-28 PROCEDURE — 83721 ASSAY OF BLOOD LIPOPROTEIN: CPT

## 2024-02-28 PROCEDURE — 80053 COMPREHEN METABOLIC PANEL: CPT

## 2024-02-28 PROCEDURE — 85025 COMPLETE CBC W/AUTO DIFF WBC: CPT

## 2024-02-28 PROCEDURE — 84443 ASSAY THYROID STIM HORMONE: CPT

## 2024-02-28 PROCEDURE — 80061 LIPID PANEL: CPT

## 2024-08-21 ENCOUNTER — HOSPITAL ENCOUNTER (OUTPATIENT)
Dept: HOSPITAL 73 - MAMMO | Age: 79
End: 2024-08-21
Payer: COMMERCIAL

## 2024-08-21 DIAGNOSIS — R92.333: ICD-10-CM

## 2024-08-21 DIAGNOSIS — Z80.3: ICD-10-CM

## 2024-08-21 DIAGNOSIS — Z12.31: Primary | ICD-10-CM

## 2024-08-21 PROCEDURE — 77063 BREAST TOMOSYNTHESIS BI: CPT

## 2024-08-21 PROCEDURE — 77067 SCR MAMMO BI INCL CAD: CPT

## 2024-08-21 NOTE — DI.MG.S_ITS
BILATERAL DIGITAL SCREENING MAMMOGRAM 3D/2D WITH CAD: 8/21/2024 
  
CLINICAL: Routine screening. Family history of breast cancer.   
  
Comparison is made to exams dated:  6/13/2023 mammogram, 2/2/2022 mammogram - [facility], and 6/26/2019 mammogram - outside facility.   
  
Both breasts are heterogeneously dense, which may obscure small masses (category c /  
51-75% glandular tissue).   
  
Current study was also evaluated with a Computer Aided Detection (CAD) system.   
No significant masses, calcifications, or other findings are seen in either breast.   
There has been no significant interval change. 
  
IMPRESSION: NEGATIVE 
There is no mammographic evidence of malignancy. A 1 year screening mammogram is  
recommended.   
  
Based on the Tyrer Cuzick model (a risk assessment model) the patient's lifetime risk is  
4.1% and her 10 year risk is 0.0%. According to the ACR, ACS, and NCCN guidelines, an  
annual breast MRI exam along with mammogram is recommended if the patient's lifetime risk  
is 20% or greater. 
  
  
This exam was interpreted at Station ID: [id number].   
  
NOTE: For mammograms, a report in lay terms will be sent to the patient. Approximately  
15% of breast malignancies will not be visualized mammographically. In the management of  
a palpable breast mass, a negative mammogram must not discourage biopsy of a clinically  
suspicious lesion. 
  
Electronically Signed By: Rosalba selby/amando:8/22/2024 10:42:47   
  
  
letter sent: Normal Exam   
ACR BI-RADS Category 1: Negative 3341F

## 2025-07-17 ENCOUNTER — HOSPITAL ENCOUNTER (EMERGENCY)
Age: 80
Discharge: HOME | End: 2025-07-17
Payer: COMMERCIAL

## 2025-07-17 VITALS — SYSTOLIC BLOOD PRESSURE: 142 MMHG | DIASTOLIC BLOOD PRESSURE: 63 MMHG | RESPIRATION RATE: 20 BRPM | HEART RATE: 70 BPM

## 2025-07-17 VITALS
SYSTOLIC BLOOD PRESSURE: 146 MMHG | HEART RATE: 71 BPM | DIASTOLIC BLOOD PRESSURE: 66 MMHG | RESPIRATION RATE: 19 BRPM | OXYGEN SATURATION: 98 %

## 2025-07-17 VITALS
DIASTOLIC BLOOD PRESSURE: 78 MMHG | RESPIRATION RATE: 20 BRPM | OXYGEN SATURATION: 99 % | HEART RATE: 80 BPM | SYSTOLIC BLOOD PRESSURE: 186 MMHG

## 2025-07-17 VITALS
DIASTOLIC BLOOD PRESSURE: 74 MMHG | TEMPERATURE: 96.8 F | HEART RATE: 81 BPM | RESPIRATION RATE: 14 BRPM | OXYGEN SATURATION: 99 % | SYSTOLIC BLOOD PRESSURE: 176 MMHG

## 2025-07-17 VITALS — RESPIRATION RATE: 22 BRPM | DIASTOLIC BLOOD PRESSURE: 58 MMHG | SYSTOLIC BLOOD PRESSURE: 123 MMHG | HEART RATE: 81 BPM

## 2025-07-17 VITALS — HEART RATE: 89 BPM

## 2025-07-17 VITALS — RESPIRATION RATE: 14 BRPM | HEART RATE: 73 BPM | OXYGEN SATURATION: 94 %

## 2025-07-17 VITALS
OXYGEN SATURATION: 99 % | HEART RATE: 68 BPM | SYSTOLIC BLOOD PRESSURE: 140 MMHG | DIASTOLIC BLOOD PRESSURE: 64 MMHG | RESPIRATION RATE: 19 BRPM

## 2025-07-17 VITALS — SYSTOLIC BLOOD PRESSURE: 154 MMHG | HEART RATE: 85 BPM | DIASTOLIC BLOOD PRESSURE: 83 MMHG

## 2025-07-17 VITALS
SYSTOLIC BLOOD PRESSURE: 149 MMHG | OXYGEN SATURATION: 99 % | DIASTOLIC BLOOD PRESSURE: 69 MMHG | RESPIRATION RATE: 20 BRPM | HEART RATE: 71 BPM

## 2025-07-17 VITALS
OXYGEN SATURATION: 97 % | RESPIRATION RATE: 22 BRPM | DIASTOLIC BLOOD PRESSURE: 63 MMHG | SYSTOLIC BLOOD PRESSURE: 133 MMHG | HEART RATE: 72 BPM

## 2025-07-17 VITALS — RESPIRATION RATE: 28 BRPM | HEART RATE: 140 BPM | OXYGEN SATURATION: 98 %

## 2025-07-17 VITALS
HEART RATE: 80 BPM | OXYGEN SATURATION: 97 % | RESPIRATION RATE: 3 BRPM | SYSTOLIC BLOOD PRESSURE: 199 MMHG | DIASTOLIC BLOOD PRESSURE: 81 MMHG

## 2025-07-17 VITALS
RESPIRATION RATE: 19 BRPM | OXYGEN SATURATION: 98 % | HEART RATE: 71 BPM | DIASTOLIC BLOOD PRESSURE: 68 MMHG | SYSTOLIC BLOOD PRESSURE: 138 MMHG

## 2025-07-17 VITALS — SYSTOLIC BLOOD PRESSURE: 124 MMHG | HEART RATE: 76 BPM | RESPIRATION RATE: 23 BRPM | DIASTOLIC BLOOD PRESSURE: 60 MMHG

## 2025-07-17 VITALS — HEART RATE: 71 BPM | DIASTOLIC BLOOD PRESSURE: 80 MMHG | SYSTOLIC BLOOD PRESSURE: 186 MMHG

## 2025-07-17 VITALS — HEART RATE: 70 BPM

## 2025-07-17 VITALS — OXYGEN SATURATION: 94 % | RESPIRATION RATE: 20 BRPM | HEART RATE: 72 BPM

## 2025-07-17 VITALS — BODY MASS INDEX: 25.4 KG/M2

## 2025-07-17 DIAGNOSIS — E87.5: Primary | ICD-10-CM

## 2025-07-17 DIAGNOSIS — N18.9: ICD-10-CM

## 2025-07-17 LAB
ADD MANUAL DIFF / SLIDE REVIEW: NO
ALBUMIN SERPL-MCNC: 4.5 G/DL (ref 3.5–5)
ALBUMIN/GLOB SERPL: 1.6 {RATIO} (ref 1–2.8)
ALP SERPL-CCNC: 74 U/L (ref 38–126)
ALT SERPL-CCNC: 18 IU/L (ref ?–35)
AST SERPL-CCNC: 22 IU/L (ref 14–36)
BASOPHILS # BLD AUTO: 100 /UL (ref 0–100)
BASOPHILS NFR BLD AUTO: 1 % (ref 0–2)
BILIRUB SERPL-MCNC: 0.5 MG/DL (ref 0.2–1.3)
BUN SERPL-MCNC: 40 MG/DL (ref 7–17)
BUN SERPL-MCNC: 43 MG/DL (ref 7–17)
BUN SERPL-MCNC: 47 MG/DL (ref 7–17)
BUN/CREAT SERPL: 24.9 (ref 6–22)
BUN/CREAT SERPL: 26 (ref 6–22)
BUN/CREAT SERPL: 26.2 (ref 6–22)
CALCIUM SERPL-MCNC: 7.5 MG/DL (ref 8.4–10.2)
CALCIUM SERPL-MCNC: 8.1 MG/DL (ref 8.4–10.2)
CALCIUM SERPL-MCNC: 9.3 MG/DL (ref 8.4–10.2)
CHLORIDE SERPL-SCNC: 107 MMOL/L (ref 98–107)
CHLORIDE SERPL-SCNC: 112 MMOL/L (ref 98–107)
CHLORIDE SERPL-SCNC: 113 MMOL/L (ref 98–107)
CO2 SERPL-SCNC: 18 MMOL/L (ref 22–32)
CO2 SERPL-SCNC: 18 MMOL/L (ref 22–32)
CO2 SERPL-SCNC: 20 MMOL/L (ref 22–32)
CREAT SERPL-MCNC: 1.54 MG/DL (ref 0.52–1.04)
CREAT SERPL-MCNC: 1.64 MG/DL (ref 0.52–1.04)
CREAT SERPL-MCNC: 1.89 MG/DL (ref 0.52–1.04)
EOSINOPHIL # BLD AUTO: 100 /UL (ref 0–450)
EOSINOPHIL NFR BLD AUTO: 2.1 % (ref 2–4)
ESTIMATED GLOMERULAR FILT RATE: 27 ML/MIN (ref 60–?)
ESTIMATED GLOMERULAR FILT RATE: 32 ML/MIN (ref 60–?)
ESTIMATED GLOMERULAR FILT RATE: 34 ML/MIN (ref 60–?)
GLOBULIN SER CALC-MCNC: 2.9 G/DL (ref 1.7–4.1)
GLUCOSE SERPL-MCNC: 112 MG/DL (ref 70–99)
GLUCOSE SERPL-MCNC: 89 MG/DL (ref 70–99)
GLUCOSE SERPL-MCNC: 90 MG/DL (ref 70–99)
HEMATOCRIT: 40 % (ref 36–46)
HEMOGLOBIN: 13.1 G/DL (ref 12–16)
HEMOLYSIS: 44 (ref 0–50)
HEMOLYSIS: < 15 (ref 0–50)
HEMOLYSIS: < 15 (ref 0–50)
LYMPHOCYTES # SPEC AUTO: 1400 /UL (ref 1100–4500)
LYMPHOCYTES PERCENT AUTO: 22.9 % (ref 25–40)
MAGNESIUM SERPL-MCNC: 1.8 MG/DL (ref 1.6–2.3)
MCH RBC QN AUTO: 29.3 PG (ref 26–34)
MCV RBC: 89.3 FL (ref 80–100)
MEAN CORPUSCULAR HGB CONC: 32.8 % (ref 30–36)
MONOCYTES # BLD AUTO: 500 /UL (ref 0–900)
MONOCYTES NFR BLD AUTO: 8.2 % (ref 3–14)
NEUTROPHILS # BLD AUTO: 4000 /UL (ref 1500–7000)
NEUTROPHILS NFR BLD AUTO: 65.8 % (ref 50–75)
PLATELET COUNT: 304 X10^3/UL (ref 150–400)
POTASSIUM SERPL-SCNC: 4.9 MMOL/L (ref 3.4–5.1)
POTASSIUM SERPL-SCNC: 5.7 MMOL/L (ref 3.4–5.1)
POTASSIUM SERPL-SCNC: 5.8 MMOL/L (ref 3.4–5.1)
PROT SERPL-MCNC: 7.4 G/DL (ref 6.3–8.2)
RED BLOOD CELL COUNT: 4.48 X10^6/UL (ref 4–5.2)
RED CELL DISTRIBUTION WIDTH: 13.7 % (ref 11.6–14.8)
SODIUM SERPL-SCNC: 135 MMOL/L (ref 137–145)
SODIUM SERPL-SCNC: 135 MMOL/L (ref 137–145)
SODIUM SERPL-SCNC: 136 MMOL/L (ref 137–145)
WHITE BLOOD CELL COUNT: 6.1 X10^3/UL (ref 4.5–11)

## 2025-07-17 PROCEDURE — 99284 EMERGENCY DEPT VISIT MOD MDM: CPT

## 2025-07-17 PROCEDURE — 93005 ELECTROCARDIOGRAM TRACING: CPT

## 2025-07-17 PROCEDURE — 80048 BASIC METABOLIC PNL TOTAL CA: CPT

## 2025-07-17 PROCEDURE — 96360 HYDRATION IV INFUSION INIT: CPT

## 2025-07-17 PROCEDURE — 36415 COLL VENOUS BLD VENIPUNCTURE: CPT

## 2025-07-17 PROCEDURE — 94640 AIRWAY INHALATION TREATMENT: CPT

## 2025-07-17 PROCEDURE — 80053 COMPREHEN METABOLIC PANEL: CPT

## 2025-07-17 PROCEDURE — 83735 ASSAY OF MAGNESIUM: CPT

## 2025-07-17 PROCEDURE — 85025 COMPLETE CBC W/AUTO DIFF WBC: CPT

## 2025-07-17 PROCEDURE — 96361 HYDRATE IV INFUSION ADD-ON: CPT
